# Patient Record
Sex: MALE | Race: WHITE
[De-identification: names, ages, dates, MRNs, and addresses within clinical notes are randomized per-mention and may not be internally consistent; named-entity substitution may affect disease eponyms.]

---

## 2018-07-10 ENCOUNTER — HOSPITAL ENCOUNTER (EMERGENCY)
Dept: HOSPITAL 62 - ER | Age: 38
Discharge: HOME | End: 2018-07-10
Payer: MEDICAID

## 2018-07-10 VITALS — SYSTOLIC BLOOD PRESSURE: 124 MMHG | DIASTOLIC BLOOD PRESSURE: 70 MMHG

## 2018-07-10 DIAGNOSIS — J18.1: Primary | ICD-10-CM

## 2018-07-10 DIAGNOSIS — R05: ICD-10-CM

## 2018-07-10 DIAGNOSIS — F17.210: ICD-10-CM

## 2018-07-10 DIAGNOSIS — M54.5: ICD-10-CM

## 2018-07-10 DIAGNOSIS — Z71.6: ICD-10-CM

## 2018-07-10 DIAGNOSIS — R06.02: ICD-10-CM

## 2018-07-10 PROCEDURE — 99285 EMERGENCY DEPT VISIT HI MDM: CPT

## 2018-07-10 PROCEDURE — 71046 X-RAY EXAM CHEST 2 VIEWS: CPT

## 2018-07-10 NOTE — RADIOLOGY REPORT (SQ)
EXAM DESCRIPTION:  CHEST 2 VIEWS



COMPLETED DATE/TIME:  7/10/2018 8:31 pm



REASON FOR STUDY:  cough



COMPARISON:  None.



EXAM PARAMETERS:  NUMBER OF VIEWS: two views

TECHNIQUE: Digital Frontal and Lateral radiographic views of the chest acquired.

RADIATION DOSE: NA

LIMITATIONS: none



FINDINGS:  LUNGS AND PLEURA: There is mild interstitial prominence in the lung bases.  An area of troncoso
ited opacification is seen in the left upper lobe.

MEDIASTINUM AND HILAR STRUCTURES: No masses or contour abnormalities.

HEART AND VASCULAR STRUCTURES: Heart normal size.  No evidence for failure.

BONES: No acute findings.

HARDWARE: None in the chest.

OTHER: No other significant finding.



IMPRESSION:  There appear to be chronic lung changes.  Cannot exclude limited left upper lobe pneumon
ia.  Follow-up after treatment to be sure that there is no mass in this area.



TECHNICAL DOCUMENTATION:  JOB ID:  [id number]

 2011 [employer]- All Rights Reserved



Reading location - IP/workstation name: [email or web address]

## 2018-07-10 NOTE — ER DOCUMENT REPORT
ED General





- General


Chief Complaint: Breathing Difficulty


Stated Complaint: BREATHING PROBLEMS AND BACK PAIN


Time Seen by Provider: 07/10/18 19:50


Mode of Arrival: Ambulatory


Information source: Patient


Notes: 





38-year-old male with no reported past medical history presents with complaint 

of cough, shortness of breath and low back pain.  Patient states that cough and 

shortness of breath started 1 month prior to arrival.  He describes the cough 

as a persistent productive cough that is associated with shortness of breath.  

Patient denies any fever, chills, nausea, vomiting.  Back pain is located in 

the lumbar spine, described as a intermittent burning pain without radiation.  

Patient does work as a  but denies any known injury.  Patient does smoke 1

/2 pack per day.


TRAVEL OUTSIDE OF THE [place] IN LAST 30 DAYS: No





- HPI


Onset: Other


Onset/Duration: Gradual, Persistent, Worse


Quality of pain: Burning


Severity: Mild


Associated symptoms: Productive cough, Shortness of breath


Exacerbated by: Movement


Relieved by: Denies


Similar symptoms previously: No


Recently seen / treated by doctor: No





- Related Data


Allergies/Adverse Reactions: 


 





No Known Allergies Allergy (Unverified 07/10/18 19:39)


 











Past Medical History





- General


Information source: Patient, [facility]





- Social History


Smoking Status: Current Every Day Smoker


Cigarette use (# per day): Yes - 10


Chew tobacco use (# tins/day): No


Smoking Education Provided: Yes - Patient counselled regarding cessation for 4 

minutes


Frequency of alcohol use: Occasional


Drug Abuse: None


Lives with: Spouse/Significant other


Family History: Reviewed & Not Pertinent


Patient has suicidal ideation: No


Patient has homicidal ideation: No





- Medical History


Medical History: Negative


Renal/ Medical History: Denies: Hx Peritoneal Dialysis





Review of Systems





- Review of Systems


Notes: 





REVIEW OF SYSTEMS:


CONSTITUTIONAL :  Denies fever,  chills, or sweats.  Denies recent illness. 

Denies weight loss, recent hospitalizations. 


EENT: Denies visual changes, eye pain.    Denies nasal or sinus congestion or 

discharge.  Denies sore throat, oral lesions, difficulty swallowing.


CARDIOVASCULAR:  Denies chest pain.  Denies palpitations. Denies lower 

extremity edema.


RESPIRATORY: 


GASTROINTESTINAL:  Denies abdominal pain or distention.  Denies nausea, vomiting

, or diarrhea.  Denies blood in vomitus, stools, or per rectum.  Denies black, 

tarry stools.  Denies constipation.  


GENITOURINARY:  Denies difficulty urinating, painful urination,  frequency, 

blood in urine, or  vaginal discharge.


MUSCULOSKELETAL:  Denies  neck pain or stiffness.  Denies joint pain or 

swelling.


SKIN:   Denies rash, lesions or sores.


HEMATOLOGIC :   Denies easy bruising or bleeding.


LYMPHATIC:  Denies swollen glands.


NEUROLOGICAL:  Denies confusion or altered mental status.  Denies passing out 

or loss of consciousness.  Denies dizziness or lightheadedness.  Denies 

headache.  Denies weakness or paralysis.  Denies problems difficulty with 

ambulation, slurred speech.  Denies sensory loss, numbness, or tingling.  

Denies seizures.


PSYCHIATRIC:  Denies anxiety or stress.  Denies depression, suicidal ideation, 

or homicidal ideation.  Denies visual or auditory hallucinations.








Physical Exam





- Vital signs


Vitals: 


 











Temp Pulse Resp BP Pulse Ox


 


 98.4 F   80   16   122/75   98 


 


 07/10/18 19:37  07/10/18 19:37  07/10/18 19:37  07/10/18 19:37  07/10/18 19:37














- Notes


Notes: 





PHYSICAL EXAMINATION:





GENERAL: Well-appearing, well-nourished and in no acute distress.





HEAD: Atraumatic, normocephalic.





EYES: Pupils equal round and reactive to light, extraocular movements intact, 

sclera anicteric, conjunctiva are normal.





ENT: Nares patent, oropharynx clear without exudates.  Moist mucous membranes.





NECK: Normal range of motion, supple without lymphadenopathy





LUNGS: Breath sounds clear to auscultation bilaterally and equal.  No wheezes 

rales or rhonchi.





HEART: Regular rate and rhythm without murmurs





ABDOMEN: Soft, nontender, nondistended abdomen.  No guarding, no rebound.  No 

masses appreciated.





Musculoskeletal: Normal range of motion, no pitting or edema.  No cyanosis.





NEUROLOGICAL: Cranial nerves grossly intact.  Normal speech, normal gait.  

Normal sensory, motor exams 





PSYCH: Normal mood, normal affect.





SKIN: Warm, Dry, normal turgor, no rashes or lesions noted.





Course





- Re-evaluation


Re-evalutation: 








 





Chest X-Ray  07/10/18 19:50


IMPRESSION:  There appear to be chronic lung changes.  Cannot exclude limited 

left upper lobe pneumonia.  Follow-up after treatment to be sure that there is 

no mass in this area.


 











07/11/18 03:35


38-year-old male with no reported past medical history presents with complaint 

of cough, shortness of breath and low back pain.  Patient states that cough and 

shortness of breath started 1 month prior to arrival.  He describes the cough 

as a persistent productive cough that is associated with shortness of breath.  

Patient denies any fever, chills, nausea, vomiting.  Back pain is located in 

the lumbar spine, described as a intermittent burning pain without radiation.  

Patient does work as a  but denies any known injury.  Patient does smoke 1

/2 pack per day.  Upon arrival vitals were reviewed and within normal limits.  

Patient does not appear toxic or dehydrated.  They are in no acute distress.  

Chest x-ray concerning for left upper lobe pneumonia.  Patient was given his 

first dose of doxycycline in the emergency department as well as an albuterol 

MDI.  Smoking cessation advised.  Patient will follow up with his primary care 

physician.  Patient provided the opportunity to ask questions, and express 

concerns.  Discharge instructions discussed. Patient is agreeable with 

discharge home.  Return indications explained and discussed with the patient 

who displays understanding.  Patient encouraged to return to the emergency 

department immediately with any concerns.


07/11/18 03:36








- Vital Signs


Vital signs: 


 











Temp Pulse Resp BP Pulse Ox


 


 98.4 F   71   20   124/70   96 


 


 07/10/18 19:37  07/10/18 22:06  07/10/18 22:06  07/10/18 22:06  07/10/18 22:06














- Diagnostic Test


Radiology reviewed: Image reviewed, Reports reviewed





Discharge





- Discharge


Clinical Impression: 


Community acquired pneumonia


Qualifiers:


 Laterality: left Lung location: upper lobe of lung Qualified Code(s): J18.1 - 

Lobar pneumonia, unspecified organism





Low back pain


Qualifiers:


 Chronicity: acute Back pain laterality: left Sciatica presence: without 

sciatica Qualified Code(s): M54.5 - Low back pain





Condition: Good


Disposition: HOME, SELF-CARE


Instructions:  Low Back Pain ([facility]), Pneumonia ([facility])


Prescriptions: 


Doxycycline Hyclate 100 mg PO BID #14 capsule


Prednisone [Deltasone 20 mg Tablet] 3 tab PO DAILY 5 Days #15 tablet


Tramadol HCl [Ultram 50 mg Tablet] 50 mg PO Q8H PRN #10 tablet


 PRN Reason: For Pain Scale 1-3


Forms:  Smoking Cessation Education


Referrals: 


STEF CORONEL MD [Primary Care Provider] - Follow up as needed

## 2018-07-10 NOTE — ER DOCUMENT REPORT
ED Medical Screen (RME)





- General


Chief Complaint: Breathing Difficulty


Stated Complaint: BREATHING PROBLEMS AND BACK PAIN


Time Seen by Provider: 07/10/18 19:50


Notes: 


Patient is a 38-year-old male who presents with 2 weeks of left lower back pain 

started after he was working as a .  He is also having 1 month of 

increased cough and intermittent gasping.  He smokes cigarettes.





PE: No respiratory distress. RRR. Tenderness over left lower back, no midline 

tenderness.





I have greeted and performed a rapid initial assessment of this patient.  A 

comprehensive ED assessment and evaluation of the patient, analysis of test 

results and completion of the medical decision making process will be conducted 

by additional ED providers.


TRAVEL OUTSIDE OF THE [place] IN LAST 30 DAYS: No





- Related Data


Allergies/Adverse Reactions: 


 





No Known Allergies Allergy (Unverified 07/10/18 19:39)


 











Past Medical History





- Social History


Chew tobacco use (# tins/day): No


Frequency of alcohol use: Occasional


Drug Abuse: None


Renal/ Medical History: Denies: Hx Peritoneal Dialysis





Physical Exam





- Vital signs


Vitals: 





 











Temp Pulse Resp BP Pulse Ox


 


 98.4 F   80   16   122/75   98 


 


 07/10/18 19:37  07/10/18 19:37  07/10/18 19:37  07/10/18 19:37  07/10/18 19:37














Course





- Vital Signs


Vital signs: 





 











Temp Pulse Resp BP Pulse Ox


 


 98.4 F   80   16   122/75   98 


 


 07/10/18 19:37  07/10/18 19:37  07/10/18 19:37  07/10/18 19:37  07/10/18 19:37

## 2020-09-22 ENCOUNTER — HOSPITAL ENCOUNTER (EMERGENCY)
Dept: HOSPITAL 62 - ER | Age: 40
Discharge: LEFT BEFORE DISCHARGE | End: 2020-09-22
Payer: MEDICAID

## 2020-09-22 VITALS — SYSTOLIC BLOOD PRESSURE: 99 MMHG | DIASTOLIC BLOOD PRESSURE: 60 MMHG

## 2020-09-22 DIAGNOSIS — Z53.21: Primary | ICD-10-CM
